# Patient Record
Sex: FEMALE
[De-identification: names, ages, dates, MRNs, and addresses within clinical notes are randomized per-mention and may not be internally consistent; named-entity substitution may affect disease eponyms.]

---

## 2017-12-17 ENCOUNTER — HOSPITAL ENCOUNTER (EMERGENCY)
Dept: HOSPITAL 5 - ED | Age: 67
Discharge: LEFT BEFORE DISCHARGE | End: 2017-12-17
Payer: SELF-PAY

## 2017-12-17 VITALS — SYSTOLIC BLOOD PRESSURE: 159 MMHG | DIASTOLIC BLOOD PRESSURE: 104 MMHG

## 2017-12-17 DIAGNOSIS — J44.1: Primary | ICD-10-CM

## 2017-12-17 DIAGNOSIS — I50.9: ICD-10-CM

## 2017-12-17 DIAGNOSIS — Z88.6: ICD-10-CM

## 2017-12-17 DIAGNOSIS — Z99.81: ICD-10-CM

## 2017-12-17 DIAGNOSIS — Z86.73: ICD-10-CM

## 2017-12-17 DIAGNOSIS — Z98.51: ICD-10-CM

## 2017-12-17 DIAGNOSIS — I11.0: ICD-10-CM

## 2017-12-17 DIAGNOSIS — Z88.8: ICD-10-CM

## 2017-12-17 DIAGNOSIS — M19.90: ICD-10-CM

## 2017-12-17 DIAGNOSIS — I25.2: ICD-10-CM

## 2017-12-17 DIAGNOSIS — E11.9: ICD-10-CM

## 2017-12-17 DIAGNOSIS — F17.210: ICD-10-CM

## 2017-12-17 LAB
ANION GAP SERPL CALC-SCNC: 13 MMOL/L
BASOPHILS NFR BLD AUTO: 0.5 % (ref 0–1.8)
BUN SERPL-MCNC: 15 MG/DL (ref 7–17)
BUN/CREAT SERPL: 25 %
CALCIUM SERPL-MCNC: 8.6 MG/DL (ref 8.4–10.2)
CHLORIDE SERPL-SCNC: 92.9 MMOL/L (ref 98–107)
CO2 SERPL-SCNC: 32 MMOL/L (ref 22–30)
EOSINOPHIL NFR BLD AUTO: 0.5 % (ref 0–4.3)
GLUCOSE SERPL-MCNC: 122 MG/DL (ref 65–100)
HCT VFR BLD CALC: 45.1 % (ref 30.3–42.9)
HGB BLD-MCNC: 14.9 GM/DL (ref 10.1–14.3)
MCH RBC QN AUTO: 29 PG (ref 28–32)
MCHC RBC AUTO-ENTMCNC: 33 % (ref 30–34)
MCV RBC AUTO: 88 FL (ref 79–97)
PLATELET # BLD: 248 K/MM3 (ref 140–440)
POTASSIUM SERPL-SCNC: 3.9 MMOL/L (ref 3.6–5)
RBC # BLD AUTO: 5.12 M/MM3 (ref 3.65–5.03)
SODIUM SERPL-SCNC: 134 MMOL/L (ref 137–145)
WBC # BLD AUTO: 8.4 K/MM3 (ref 4.5–11)

## 2017-12-17 PROCEDURE — 93010 ELECTROCARDIOGRAM REPORT: CPT

## 2017-12-17 PROCEDURE — 84484 ASSAY OF TROPONIN QUANT: CPT

## 2017-12-17 PROCEDURE — 71020: CPT

## 2017-12-17 PROCEDURE — 93005 ELECTROCARDIOGRAM TRACING: CPT

## 2017-12-17 PROCEDURE — 99284 EMERGENCY DEPT VISIT MOD MDM: CPT

## 2017-12-17 PROCEDURE — 83880 ASSAY OF NATRIURETIC PEPTIDE: CPT

## 2017-12-17 PROCEDURE — 36415 COLL VENOUS BLD VENIPUNCTURE: CPT

## 2017-12-17 PROCEDURE — 85025 COMPLETE CBC W/AUTO DIFF WBC: CPT

## 2017-12-17 PROCEDURE — 87400 INFLUENZA A/B EACH AG IA: CPT

## 2017-12-17 PROCEDURE — 80048 BASIC METABOLIC PNL TOTAL CA: CPT

## 2017-12-17 NOTE — EMERGENCY DEPARTMENT REPORT
HPI





- General


Chief Complaint: Dyspnea/Respdistress





- HPI


HPI: 


This is a 67-year-old  female presents to the emergency department 

from home with complaint of some nausea and vomiting.  Patient says that she 

has been dealing with some "cold" symptoms and thinks that she might have "the 

flu."  The patient has a past medical history of asthma, CHF, COPD on 2 L nasal 

cannula, CVA, diabetes, coronary artery disease with MI and hypertension.  She 

is also a tobacco smoker but says that she is trying to quit.  She has a 

primary care physician, pulmonologist and cardiologist but has not seen them 

regarding these current symptoms.  She denies any fever, chest pain or 

diaphoresis.  She does have some shortness of breath but says that she always 

has this.  She also has bilateral lower extremity swelling that worsened a few 

days ago but once again she says that she always has those symptoms.








ED Past Medical Hx





- Past Medical History


Previous Medical History?: Yes


Hx Hypertension: Yes


Hx CVA: Yes


Hx Heart Attack/AMI: Yes


Hx Congestive Heart Failure: Yes


Hx Diabetes: Yes


Hx Arthritis: Yes


Hx Asthma: Yes


Hx COPD: Yes (Home 02 2 liters)


Additional medical history: smoke 1/2 pack cigarettes a day





- Surgical History


Additional Surgical History: tubal ligation





- Social History


Smoking Status: Current Every Day Smoker





- Medications


Home Medications: 


 Home Medications











 Medication  Instructions  Recorded  Confirmed  Last Taken  Type


 


Unobtainable [Unobtainable]  10/20/13 10/20/13 Unknown History














ED Review of Systems


ROS: 


Stated complaint: N/V; BODYACHES; FATIGUE


Other details as noted in HPI





Comment: All other systems reviewed and negative


Constitutional: denies: chills, fever


ENT: denies: ear pain, throat pain


Respiratory: cough, shortness of breath (chronic)


Cardiovascular: edema.  denies: chest pain


Gastrointestinal: nausea, vomiting.  denies: abdominal pain


Genitourinary: denies: urgency, dysuria, discharge


Musculoskeletal: denies: back pain, joint swelling, arthralgia


Skin: denies: rash, lesions





Physical Exam





- Physical Exam


Vital Signs: 


 Vital Signs











  12/17/17





  00:51


 


Temperature 98.6 F


 


Pulse Rate 86


 


Respiratory 24





Rate 


 


Blood Pressure 159/104


 


O2 Sat by Pulse 91





Oximetry 











Physical Exam: 


GENERAL: The patient is well-developed well-nourished.


HENT: Normocephalic.  Atraumatic.    Patient has moist mucous membranes.


EYES: Extraocular motions are intact.  Pupils equal reactive to light 

bilaterally.


NECK: Supple.  Trachea is midline.


CHEST/LUNGS: Coarse breath sound heard throughout the chest.  There is some 

mild wheezing heard.  Mild tachypnea but no accessory muscle use.  There is no 

respiratory distress noted.


HEART/CARDIOVASCULAR: Regular.  There is no tachycardia.  There is no murmur.


ABDOMEN: Abdomen is soft, nontender.  Patient has normal bowel sounds.  There 

is no abdominal distention.


SKIN: Skin is warm and dry.  There is some pitting edema to the bilateral lower 

extremities.


NEURO: The patient is awake, alert, and oriented.  The patient is cooperative.  

The patient has no focal neurologic deficits.  The patient has normal speech.


MUSCULOSKELETAL: There is no tenderness or deformity. There is no evidence of 

acute injury.








ED Course


 Vital Signs











  12/17/17





  00:51


 


Temperature 98.6 F


 


Pulse Rate 86


 


Respiratory 24





Rate 


 


Blood Pressure 159/104


 


O2 Sat by Pulse 91





Oximetry 














ED Medical Decision Making





- Lab Data


Result diagrams: 


 12/17/17 01:17





 12/17/17 01:17





- EKG Data


-: EKG Interpreted by Me


EKG shows normal: sinus rhythm, axis, intervals, QRS complexes, ST-T waves (

Nonspecific T-waves)


Rate: normal





- EKG Data


When compared to previous EKG there are: previous EKG unavailable


Interpretation: other (sinus rhythm, normal axis, normal rate, nonspecific T 

waves.)





- Radiology Data


Radiology results: image reviewed


interpreted by me: 





Chest x-ray shows some mild cardiomegaly and some pulmonary vascular 

congestion.  No obvious pleural effusions.  No obvious pneumonia.





- Medical Decision Making


The patient had the majority of her blood work done through triage.  It shows 

an equivocal troponin of 0.017.  She has a BNP of greater than 21,000 and both 

of this is without any renal insufficiency.  Chest x-ray shows some pulmonary 

vascular congestion.  Negative for influenza.  Patient has some hypoxia on room 

air but is usually 2 L oxygen dependent at home.  Even before I was able to get 

this patient's chart and see her in the room, she was already telling the nurse 

that she has to go home.  She is very concerned to make sure that her son is 

able to get home and get to work on time.  I sat down with the patient and 

explained that she had lab abnormalities and appeared to me to have signs of 

CHF and COPD exacerbation.  I feel that she requires admission for diuresis, 

breathing treatments and possibly cardio and/or pulmonology consultation.  

However the patient still refuses to stay for any further testing and certainly 

for admission.  She understands that this could lead to worsening of her nausea 

and vomiting, shortness of breath, heart attack, respiratory distress, coma or 

even death.  She says that she plans to follow-up with her primary care 

physician and her specialists as early this week as possible and says that she 

will return to the ER if anything changes.  Despite the risks mention to her, 

she does not want to stay and has decided to sign out AGAINST MEDICAL ADVICE.








- Differential Diagnosis


CHF, COPD, MI, PE


Critical Care Time: No


Critical care attestation.: 


If time is entered above; I have spent that time in minutes in the direct care 

of this critically ill patient, excluding procedure time.








ED Disposition


Clinical Impression: 


 COPD exacerbation, Lower extremity edema





CHF exacerbation


Qualifiers:


 Congestive heart failure type: unspecified congestive heart failure type 

Qualified Code(s): I50.9 - Heart failure, unspecified





Hypertension


Qualifiers:


 Hypertension type: essential hypertension Qualified Code(s): I10 - Essential (

primary) hypertension





Disposition: DC-07 LEFT AGAINST MED ADVICE


Is pt being admited?: No


Condition: Fair


Instructions:  Chronic Bronchitis (ED), Hypertension (ED)


Time of Disposition: 04:02

## 2017-12-17 NOTE — XRAY REPORT
FINAL REPORT



PROCEDURE:  XR CHEST ROUTINE 2V



TECHNIQUE:  PA and lateral chest radiographs were obtained. CPT

00884







HISTORY:  Shortness of breath, SOB, cough, and fever..started

getting worse today 



COMPARISON:  No prior studies are available for comparison.



FINDINGS:  

Heart: Heart is enlarged.



Mediastinum/Vessels: Normal.



Lungs/Pleural space: Lungs are expanded and clear. There are no

infiltrates, effusions or pneumothoraces..



Bony thorax: No acute osseous abnormality. There is degenerative

arthrosis of the glenohumeral joints. 



Other: 



IMPRESSION:  

Heart is enlarged. There is no acute lung disease.

## 2018-05-16 ENCOUNTER — HOSPITAL ENCOUNTER (INPATIENT)
Dept: HOSPITAL 5 - ED | Age: 68
LOS: 4 days | Discharge: HOME | DRG: 291 | End: 2018-05-20
Attending: INTERNAL MEDICINE | Admitting: INTERNAL MEDICINE
Payer: MEDICARE

## 2018-05-16 DIAGNOSIS — J44.1: ICD-10-CM

## 2018-05-16 DIAGNOSIS — F17.210: ICD-10-CM

## 2018-05-16 DIAGNOSIS — I25.2: ICD-10-CM

## 2018-05-16 DIAGNOSIS — Z88.5: ICD-10-CM

## 2018-05-16 DIAGNOSIS — Z72.89: ICD-10-CM

## 2018-05-16 DIAGNOSIS — I11.0: Primary | ICD-10-CM

## 2018-05-16 DIAGNOSIS — Z99.81: ICD-10-CM

## 2018-05-16 DIAGNOSIS — Z98.51: ICD-10-CM

## 2018-05-16 DIAGNOSIS — I50.23: ICD-10-CM

## 2018-05-16 DIAGNOSIS — M19.90: ICD-10-CM

## 2018-05-16 DIAGNOSIS — E87.1: ICD-10-CM

## 2018-05-16 DIAGNOSIS — E11.9: ICD-10-CM

## 2018-05-16 DIAGNOSIS — Z86.73: ICD-10-CM

## 2018-05-16 DIAGNOSIS — I42.0: ICD-10-CM

## 2018-05-16 DIAGNOSIS — J96.21: ICD-10-CM

## 2018-05-16 LAB
BASOPHILS # (AUTO): 0 K/MM3 (ref 0–0.1)
BASOPHILS NFR BLD AUTO: 0.2 % (ref 0–1.8)
BUN SERPL-MCNC: 12 MG/DL (ref 7–17)
BUN/CREAT SERPL: 24 %
CALCIUM SERPL-MCNC: 9.2 MG/DL (ref 8.4–10.2)
CREATININE,URINE: 19 MG/DL (ref 0.1–20)
EOSINOPHIL # BLD AUTO: 0 K/MM3 (ref 0–0.4)
EOSINOPHIL NFR BLD AUTO: 0.3 % (ref 0–4.3)
HCT VFR BLD CALC: 46.1 % (ref 30.3–42.9)
HEMOLYSIS INDEX: 3
HGB BLD-MCNC: 15.9 GM/DL (ref 10.1–14.3)
LYMPHOCYTES # BLD AUTO: 1.1 K/MM3 (ref 1.2–5.4)
LYMPHOCYTES NFR BLD AUTO: 12.7 % (ref 13.4–35)
MCH RBC QN AUTO: 30 PG (ref 28–32)
MCHC RBC AUTO-ENTMCNC: 35 % (ref 30–34)
MCV RBC AUTO: 88 FL (ref 79–97)
MONOCYTES # (AUTO): 0.8 K/MM3 (ref 0–0.8)
MONOCYTES % (AUTO): 8.7 % (ref 0–7.3)
PLATELET # BLD: 274 K/MM3 (ref 140–440)
RBC # BLD AUTO: 5.23 M/MM3 (ref 3.65–5.03)
T4 FREE SERPL-MCNC: 1.69 NG/DL (ref 0.76–1.46)

## 2018-05-16 PROCEDURE — 93005 ELECTROCARDIOGRAM TRACING: CPT

## 2018-05-16 PROCEDURE — 99406 BEHAV CHNG SMOKING 3-10 MIN: CPT

## 2018-05-16 PROCEDURE — 85025 COMPLETE CBC W/AUTO DIFF WBC: CPT

## 2018-05-16 PROCEDURE — 93010 ELECTROCARDIOGRAM REPORT: CPT

## 2018-05-16 PROCEDURE — 80061 LIPID PANEL: CPT

## 2018-05-16 PROCEDURE — 84484 ASSAY OF TROPONIN QUANT: CPT

## 2018-05-16 PROCEDURE — 80048 BASIC METABOLIC PNL TOTAL CA: CPT

## 2018-05-16 PROCEDURE — 84300 ASSAY OF URINE SODIUM: CPT

## 2018-05-16 PROCEDURE — 84439 ASSAY OF FREE THYROXINE: CPT

## 2018-05-16 PROCEDURE — 82570 ASSAY OF URINE CREATININE: CPT

## 2018-05-16 PROCEDURE — 93306 TTE W/DOPPLER COMPLETE: CPT

## 2018-05-16 PROCEDURE — 96374 THER/PROPH/DIAG INJ IV PUSH: CPT

## 2018-05-16 PROCEDURE — 84443 ASSAY THYROID STIM HORMONE: CPT

## 2018-05-16 PROCEDURE — 83880 ASSAY OF NATRIURETIC PEPTIDE: CPT

## 2018-05-16 PROCEDURE — 82962 GLUCOSE BLOOD TEST: CPT

## 2018-05-16 PROCEDURE — 94760 N-INVAS EAR/PLS OXIMETRY 1: CPT

## 2018-05-16 PROCEDURE — 71045 X-RAY EXAM CHEST 1 VIEW: CPT

## 2018-05-16 PROCEDURE — 36415 COLL VENOUS BLD VENIPUNCTURE: CPT

## 2018-05-16 PROCEDURE — 94640 AIRWAY INHALATION TREATMENT: CPT

## 2018-05-16 PROCEDURE — 83036 HEMOGLOBIN GLYCOSYLATED A1C: CPT

## 2018-05-16 RX ADMIN — FUROSEMIDE SCH MG: 20 TABLET ORAL at 18:36

## 2018-05-16 RX ADMIN — Medication SCH ML: at 22:03

## 2018-05-16 NOTE — CONSULTATION
History of Present Illness


Consult date: 05/16/18


Consult reason: congestive heart failure


History of present illness: 





Patient is a 67yr old woman with a history of COPD on home oxygen who continues 

to smoke cigarettes. She was brought to the emergency department with shortness 

of breath, active wheezing, admitted with COPD exacerbation. 





Chest xray reports marked cardiomegaly with mild interstitial edema. Labs 

notable for a BNP of 6600 and severe hyponatremia, sodium of 122. She denies 

chest pain. Noted with lower extremity edema.  Findings consistent with heart 

failure. Patient denies a prior cardiac history. She denies prior cardiac 

workup. Her EKG is a sinus rhythm with LVH. No acute ischemic changes. Cardiac 

consultation was requested for further CHF evaluation and management.











Past History


Social history: , smoking


Family history: no significant family history (reviewed)





Medications and Allergies


 Allergies











Allergy/AdvReac Type Severity Reaction Status Date / Time


 


ibuprofen [From Motrin] Allergy  Rash Verified 05/16/18 13:23


 


codeine AdvReac  Vomiting Verified 05/16/18 13:23


 


etodolac [From Lodine] AdvReac  Vomiting Verified 05/16/18 13:23











 Home Medications











 Medication  Instructions  Recorded  Confirmed  Last Taken  Type


 


Unobtainable  10/20/13 10/20/13 Unknown History











Active Meds: 


Active Medications





Acetaminophen (Tylenol)  650 mg PO Q4H PRN


   PRN Reason: Pain MILD(1-3)/Fever >100.5/HA


Albuterol (Proventil)  2.5 mg IH Q4HRT PRN


   PRN Reason: Shortness Of Breath


Furosemide (Lasix)  20 mg PO BID@0600,1800 KEYLA


Ondansetron HCl (Zofran)  4 mg IV Q8H PRN


   PRN Reason: Nausea And Vomiting


Sodium Chloride (Sodium Chloride Flush Syringe 10 Ml)  10 ml IV BID KEYLA


Sodium Chloride (Sodium Chloride Flush Syringe 10 Ml)  10 ml IV PRN PRN


   PRN Reason: LINE FLUSH











Physical Examination


 Vital Signs











Resp


 


 28 H


 


 05/16/18 13:14











General appearance: mild distress


Cardiac: Positive: Reg Rate and Rhythm


Lungs: Positive: Wheezes


Extremities: Present: +3 Edema





Results





 05/16/18 13:31





 05/16/18 13:31


 CBC











  05/16/18 Range/Units





  13:31 


 


WBC  8.8  (4.5-11.0)  K/mm3


 


RBC  5.23 H  (3.65-5.03)  M/mm3


 


Hgb  15.9 H  (10.1-14.3)  gm/dl


 


Hct  46.1 H  (30.3-42.9)  %


 


Plt Count  274  (140-440)  K/mm3


 


Lymph #  1.1 L  (1.2-5.4)  K/mm3


 


Mono #  0.8  (0.0-0.8)  K/mm3


 


Eos #  0.0  (0.0-0.4)  K/mm3


 


Baso #  0.0  (0.0-0.1)  K/mm3








 Comprehensive Metabolic Panel











  05/16/18 Range/Units





  13:31 


 


Sodium  122 L  (137-145)  mmol/L


 


Potassium  3.7  (3.6-5.0)  mmol/L


 


Chloride  77.0 L  ()  mmol/L


 


Carbon Dioxide  36 H  (22-30)  mmol/L


 


BUN  12  (7-17)  mg/dL


 


Creatinine  0.5 L  (0.7-1.2)  mg/dL


 


Glucose  135 H  ()  mg/dL


 


Calcium  9.2  (8.4-10.2)  mg/dL














Assessment and Plan





COPD exacerbation


Acute heart failure


Hyponatremia





We will get an echocardiogram for LVEF assessment.


Pulmonary consultation for management of COPD.

## 2018-05-16 NOTE — XRAY REPORT
Single view chest: Compared to 12/17/17.



History: Shortness of breath.



Findings:



Marked cardiomegaly. Trachea is midline. Mild to moderate venous 

congestion. No consolidation or pleural effusion.



Impression:



Probable early CHF. The No significant interval change.

## 2018-05-16 NOTE — HISTORY AND PHYSICAL REPORT
History of Present Illness


Chief complaint: 





Im short of breath


History of present illness: 


68 YO Female with HTN, CVA, DM, OA, COPD on Home Oxygen, Chronic Respiratory 

Failure, Nicotine Dependence presents to ED for evaluation. Pt states that she 

has experienced shortness of breath and cough for the past 3 days with 

worsening symptoms over the past 2 days. Pt acknowledges Orthopnea/PND, 

Bilateral leg swelling. Pt states she became concerned this morning, and called 

EMS. Upon arrival, the patient was found to have Acute hypoxemic Respiratory 

Failure with pulse oximetry of 82% on room air. PT was treated with 

supplemental oxygen and transported to [facility] for further care and evaluation. Pt 

seen and evaluated in ED and found to have Acute CHF with concomitant 

respiratory failure. Pt admitted to telemetry. Cardiology consulted in ED. Pt 

denies fever, chills, CP, Palpitations, NVD, Syncope, Trauma, BRBPR, hemoptysis

, or recent ill contacts. 





Past History


Past Medical History: acute MI, arthritis, COPD, diabetes, heart failure, 

hypertension, stroke


Past Surgical History: Other (Tubal ligation)


Social history: , smoking


Family history: no significant family history (reviewed)





Medications and Allergies


 Allergies











Allergy/AdvReac Type Severity Reaction Status Date / Time


 


ibuprofen [From Motrin] Allergy  Rash Verified 05/16/18 13:23


 


codeine AdvReac  Vomiting Verified 05/16/18 13:23


 


etodolac [From Lodine] AdvReac  Vomiting Verified 05/16/18 13:23











 Home Medications











 Medication  Instructions  Recorded  Confirmed  Last Taken  Type


 


Unobtainable  10/20/13 10/20/13 Unknown History














Review of Systems


Constitutional: no weight loss, no weight gain, no fever, no chills


Ears, nose, mouth and throat: no ear pain, no ear discharge, no tinnitis, no 

decreased hearing, no nose pain, no nasal congestion


Breasts: no change in shape, no swelling, no mass


Cardiovascular: orthopnea, edema, shortness of breath, paroxysmal nocturnal 

dyspnea, no chest pain, no syncope


Respiratory: cough, no excessive sputum, no hemoptysis


Gastrointestinal: no nausea, no vomiting, no diarrhea, no constipation


Genitourinary Female: no pelvic pain, no flank pain, no dysuria, no urinary 

frequency, no urgency


Rectal: no pain, no incontinence, no bleeding


Musculoskeletal: no neck pain, no shooting arm pain, no arm numbness/tingling, 

no low back pain, no shooting leg pain, no leg numbness/tingling


Integumentary: no rash, no pruritis, no redness, no sores, no wounds


Neurological: no weakness, no parathesias, no numbness, no tingling, no seizures

, no syncope


Endocrine: no cold intolerance, no heat intolerance, no polyphagia, no 

excessive thirst, no polydipsia, no polyuria


Hematologic/Lymphatic: no easy bruising, no easy bleeding, no lymphadenopathy, 

no lymphedema


Allergic/Immunologic: no urticaria, no allergic rhinitis, no wheezing, no 

persistent infections, no anaphylaxis





Exam





- Constitutional


Vitals: 


 











Temp Pulse Resp BP Pulse Ox


 


 98.1 F   73   18   130/70   92 


 


 05/16/18 13:24  05/16/18 14:30  05/16/18 14:30  05/16/18 14:30  05/16/18 14:30











General appearance: Present: mild distress





- EENT


Eyes: Present: PERRL


ENT: hearing intact, clear oral mucosa





- Neck


Neck: Present: supple, normal ROM





- Respiratory


Respiratory effort: labored


Respiratory: bilateral: diminished, rhonchi





- Cardiovascular


Heart Sounds: Present: S1 & S2.  Absent: rub, click





- Extremities


Extremities: pulses symmetrical, No edema


Peripheral Pulses: within normal limits





- Abdominal


General gastrointestinal: Present: soft, non-tender, non-distended, normal 

bowel sounds


Female genitourinary: Present: normal





- Integumentary


Integumentary: Present: clear, warm, dry





- Musculoskeletal


Musculoskeletal: gait normal, strength equal bilaterally





- Psychiatric


Psychiatric: appropriate mood/affect, intact judgment & insight





- Neurologic


Neurologic: CNII-XII intact, moves all extremities





Results





- Labs


CBC & Chem 7: 


 05/16/18 13:31





 05/16/18 13:31


Labs: 


 Abnormal lab results











  05/16/18 05/16/18 05/16/18 Range/Units





  13:31 13:31 14:22 


 


RBC   5.23 H   (3.65-5.03)  M/mm3


 


Hgb   15.9 H   (10.1-14.3)  gm/dl


 


Hct   46.1 H   (30.3-42.9)  %


 


MCHC   35 H   (30-34)  %


 


Lymph % (Auto)   12.7 L   (13.4-35.0)  %


 


Mono % (Auto)   8.7 H   (0.0-7.3)  %


 


Lymph #   1.1 L   (1.2-5.4)  K/mm3


 


Seg Neutrophils %   78.1 H   (40.0-70.0)  %


 


Sodium  122 L    (137-145)  mmol/L


 


Chloride  77.0 L    ()  mmol/L


 


Carbon Dioxide  36 H    (22-30)  mmol/L


 


Creatinine  0.5 L    (0.7-1.2)  mg/dL


 


Glucose  135 H    ()  mg/dL


 


POC Glucose    137 H  ()  


 


NT-Pro-B Natriuret Pep  6600 H    (0-900)  pg/mL














Assessment and Plan





- Patient Problems


(1) CHF (congestive heart failure)


Current Visit: Yes   Status: Acute   


Qualifiers: 


   Heart failure type: systolic   Heart failure chronicity: acute   Qualified 

Code(s): I50.21 - Acute systolic (congestive) heart failure   


Plan to address problem: 


Admit to telemetry, Strict I/O, Daily weight, monitor uop q shift, supplemental 

oxygen, Echo, cardiology consulted in ED, thyroid panel, BNP,Chest X ray








(2) Hyponatremia syndrome


Current Visit: Yes   Status: Acute   


Plan to address problem: 


Urine electrolytes, repeat bmp, 








(3) COPD (chronic obstructive pulmonary disease)


Current Visit: Yes   Status: Acute   


Qualifiers: 


   COPD type: chronic bronchitis 


Plan to address problem: 


supplemental oxygen, nebulizer therapy, incentive spirometry, smoking cessation 

counseling, NIPPV as clinically indicated. 








(4) Acute respiratory failure


Current Visit: Yes   Status: Acute   


Qualifiers: 


   Respiratory failure complication: hypoxia   Qualified Code(s): J96.01 - 

Acute respiratory failure with hypoxia   


Plan to address problem: 


Supplemental oxygen, nebulizer therapy, diuresis, pulse oximetry, 








(5) DVT prophylaxis


Current Visit: Yes   Status: Acute   


Plan to address problem: 


SCD to BLE while in bed

## 2018-05-16 NOTE — EMERGENCY DEPARTMENT REPORT
ED Shortness of Breath HPI





- General


Chief Complaint: Dyspnea/Respdistress


Stated Complaint: DIFFICULTY  BREATHING


Time Seen by Provider: 05/16/18 13:33


Source: patient, EMS


Mode of arrival: Stretcher


Limitations: No Limitations





- History of Present Illness


Initial Comments: 





Patient is a 67-year-old  female with past medical history of COPD and 

CHF who is presenting with shortness of breath.  Patient states she's had some 

increased work of breathing for the past 2-3 days.  Patient called 911 and when 

she was seen by the paramedics her O2 sats when the low 80s.  Patient was noted 

to be wheezing in route and did receive a breathing treatment and Solu-Medrol.  

Patient states that she has had a cough is nonproductive she denies any fevers 

nausea vomiting diarrhea abdominal pain at this time.





- Related Data


 Home Medications











 Medication  Instructions  Recorded  Confirmed  Last Taken


 


Unobtainable  10/20/13 10/20/13 Unknown











 Allergies











Allergy/AdvReac Type Severity Reaction Status Date / Time


 


ibuprofen [From Motrin] Allergy  Rash Verified 05/16/18 13:23


 


codeine AdvReac  Vomiting Verified 05/16/18 13:23


 


etodolac [From Lodine] AdvReac  Vomiting Verified 05/16/18 13:23














ED Review of Systems


ROS: 


Stated complaint: DIFFICULTY  BREATHING


Other details as noted in HPI





Comment: All other systems reviewed and negative





ED Past Medical Hx





- Past Medical History


Hx Hypertension: Yes


Hx CVA: Yes


Hx Heart Attack/AMI: Yes


Hx Congestive Heart Failure: Yes


Hx Diabetes: Yes


Hx Arthritis: Yes


Hx Asthma: Yes


Hx COPD: Yes (Home 02 2 liters)


Additional medical history: smoke 1/2 pack cigarettes a day





- Surgical History


Additional Surgical History: tubal ligation





- Social History


Smoking Status: Current Every Day Smoker


Substance Use Type: Alcohol





- Medications


Home Medications: 


 Home Medications











 Medication  Instructions  Recorded  Confirmed  Last Taken  Type


 


Unobtainable  10/20/13 10/20/13 Unknown History














ED Physical Exam





- General


Limitations: No Limitations


General appearance: alert, in no apparent distress





- Head


Head exam: Present: atraumatic, normocephalic





- Eye


Eye exam: Present: normal appearance





- ENT


ENT exam: Present: mucous membranes moist





- Neck


Neck exam: Present: normal inspection





- Respiratory


Respiratory exam: Present: normal lung sounds bilaterally, rales.  Absent: 

respiratory distress, wheezes, rhonchi, stridor





- Cardiovascular


Cardiovascular Exam: Present: regular rate, normal rhythm.  Absent: systolic 

murmur, diastolic murmur, rubs, gallop





- GI/Abdominal


GI/Abdominal exam: Present: soft, normal bowel sounds.  Absent: distended, 

tenderness, guarding





- Extremities Exam


Extremities exam: Present: normal inspection





- Back Exam


Back exam: Present: normal inspection





- Neurological Exam


Neurological exam: Present: alert, oriented X3





- Psychiatric


Psychiatric exam: Present: normal affect, normal mood





- Skin


Skin exam: Present: warm, dry, intact, normal color.  Absent: rash





ED Course


 Vital Signs











  05/16/18 05/16/18 05/16/18





  13:14 13:15 13:24


 


Temperature   98.1 F


 


Pulse Rate  82 77


 


Respiratory 28 H 27 H 22





Rate   


 


Blood Pressure   138/70


 


O2 Sat by Pulse  83 L 96





Oximetry   














  05/16/18 05/16/18 05/16/18





  13:30 13:45 14:01


 


Temperature   


 


Pulse Rate 75 77 85


 


Respiratory 18 23 29 H





Rate   


 


Blood Pressure 138/64 129/69 128/60


 


O2 Sat by Pulse 100 76 L 99





Oximetry   














  05/16/18





  14:30


 


Temperature 


 


Pulse Rate 73


 


Respiratory 18





Rate 


 


Blood Pressure 130/70


 


O2 Sat by Pulse 92





Oximetry 














ED Medical Decision Making





- Lab Data


Result diagrams: 


 05/16/18 13:31





 05/16/18 13:31








 Lab Results











  05/16/18 05/16/18 05/16/18 Range/Units





  13:31 13:31 14:22 


 


WBC   8.8   (4.5-11.0)  K/mm3


 


RBC   5.23 H   (3.65-5.03)  M/mm3


 


Hgb   15.9 H   (10.1-14.3)  gm/dl


 


Hct   46.1 H   (30.3-42.9)  %


 


MCV   88   (79-97)  fl


 


MCH   30   (28-32)  pg


 


MCHC   35 H   (30-34)  %


 


RDW   13.9   (13.2-15.2)  %


 


Plt Count   274   (140-440)  K/mm3


 


Lymph % (Auto)   12.7 L   (13.4-35.0)  %


 


Mono % (Auto)   8.7 H   (0.0-7.3)  %


 


Eos % (Auto)   0.3   (0.0-4.3)  %


 


Baso % (Auto)   0.2   (0.0-1.8)  %


 


Lymph #   1.1 L   (1.2-5.4)  K/mm3


 


Mono #   0.8   (0.0-0.8)  K/mm3


 


Eos #   0.0   (0.0-0.4)  K/mm3


 


Baso #   0.0   (0.0-0.1)  K/mm3


 


Seg Neutrophils %   78.1 H   (40.0-70.0)  %


 


Seg Neutrophils #   6.9   (1.8-7.7)  K/mm3


 


Sodium  122 L    (137-145)  mmol/L


 


Potassium  3.7    (3.6-5.0)  mmol/L


 


Chloride  77.0 L    ()  mmol/L


 


Carbon Dioxide  36 H    (22-30)  mmol/L


 


Anion Gap  13    mmol/L


 


BUN  12    (7-17)  mg/dL


 


Creatinine  0.5 L    (0.7-1.2)  mg/dL


 


Estimated GFR  > 60    ml/min


 


BUN/Creatinine Ratio  24    %


 


Glucose  135 H    ()  mg/dL


 


POC Glucose    137 H  ()  


 


Calcium  9.2    (8.4-10.2)  mg/dL


 


Troponin T  < 0.010    (0.00-0.029)  ng/mL


 


NT-Pro-B Natriuret Pep  6600 H    (0-900)  pg/mL














- EKG Data


-: EKG Interpreted by Me





- EKG Data


Interpretation: other (EKG shows sinus rhythm rate of 75 normal axis normal 

intervals lateral T-wave inversions and no ST segment elevations or depressions

, interpretation is 1335)





- Radiology Data





Chest x-ray shows pulmonary edema and cardiomegaly consistent with CHF 

exacerbation





- Medical Decision Making





Patient is a 67-year-old  female who is here with respiratory 

distress.  Patient's wheezing had improved by time she arrived.  Patient was 

given 60 of Lasix IV.  Patient was tried on nasal cannula however her O2 sats 

continued to drop when she is not O nonrebreather.  Patient was started on 

Ventimask and respiratory will monitor.  Patient will be admitted at this time 

to Dr. Canela with the hospitalist service.  We will continue to monitor the 

patient's O2 sat restore status and given the respiratory therapist the go 

ahead to start the patient on BiPAP if warranted.


Critical care attestation.: 


If time is entered above; I have spent that time in minutes in the direct care 

of this critically ill patient, excluding procedure time.








ED Disposition


Clinical Impression: 


 Hypoxia





CHF exacerbation


Qualifiers:


 Heart failure type: unspecified Qualified Code(s): I50.9 - Heart failure, 

unspecified





Disposition: DC-01 TO HOME OR SELFCARE


Is pt being admited?: Yes


Does the pt Need Aspirin: No


Condition: Serious

## 2018-05-17 LAB — HDLC SERPL-MCNC: 58 MG/DL (ref 40–59)

## 2018-05-17 RX ADMIN — IPRATROPIUM BROMIDE AND ALBUTEROL SULFATE SCH AMPUL: .5; 3 SOLUTION RESPIRATORY (INHALATION) at 16:45

## 2018-05-17 RX ADMIN — MILRINONE LACTATE IN DEXTROSE SCH MLS/HR: 200 INJECTION, SOLUTION INTRAVENOUS at 18:44

## 2018-05-17 RX ADMIN — Medication SCH ML: at 11:42

## 2018-05-17 RX ADMIN — IPRATROPIUM BROMIDE AND ALBUTEROL SULFATE SCH: .5; 3 SOLUTION RESPIRATORY (INHALATION) at 16:49

## 2018-05-17 RX ADMIN — CARVEDILOL SCH: 3.12 TABLET, FILM COATED ORAL at 22:00

## 2018-05-17 RX ADMIN — FUROSEMIDE SCH MG: 20 TABLET ORAL at 06:16

## 2018-05-17 RX ADMIN — ACETAMINOPHEN PRN MG: 325 TABLET ORAL at 11:43

## 2018-05-17 RX ADMIN — IPRATROPIUM BROMIDE AND ALBUTEROL SULFATE SCH AMPUL: .5; 3 SOLUTION RESPIRATORY (INHALATION) at 21:15

## 2018-05-17 NOTE — PROGRESS NOTE
Assessment and Plan





COPD exacerbation


Acute systolic heart failure, uncertain etiology


   severe 4 chamber dilated cardiomyopathy, EF 10-15% by echocardiogram.


Hyponatremia





Recommend:


Medical therapy for acute systolic heart failure.


Sodium/fluid restriction.


Daily weight.


Further ischemic cardiac evaluation will depend on clinical course.





Subjective


Date of service: 05/17/18


Interval history: 





Patient is still short of breath. She denies chest pain.





Objective


 Vital Signs











  Temp Pulse Pulse Resp BP Pulse Ox


 


 05/17/18 12:02    79  18   95


 


 05/17/18 12:00   82    


 


 05/17/18 11:19  97.6 F  74   20  116/67  98


 


 05/17/18 07:39  97.9 F  66   18  120/68  100


 


 05/17/18 04:33  97.5 F L  81    125/79  97


 


 05/17/18 04:00   77    


 


 05/16/18 23:35  98.0 F  64    115/57  99


 


 05/16/18 20:47       97


 


 05/16/18 20:31       98


 


 05/16/18 20:18  97.6 F     


 


 05/16/18 20:00   74    


 


 05/16/18 19:20   75    134/69  99


 


 05/16/18 18:26  97.8 F  80    135/68  96


 


 05/16/18 16:51   72   16  115/51  94


 


 05/16/18 16:41   77   16  135/63  93


 


 05/16/18 16:30   76   18  135/63  92


 


 05/16/18 16:21   81   22  129/57  93


 


 05/16/18 16:11   66   15  129/57  92


 


 05/16/18 16:00   77   17  129/57  87














- Physical Examination


General: Other (Mild distress)


Cardiac: Positive: Reg Rate and Rhythm


Lungs: Positive: Decreased Breath Sounds


Neuro: Positive: Grossly Intact


Extremities: Present: +2 Edema

## 2018-05-17 NOTE — PROGRESS NOTE
Assessment and Plan


Assessment and plan: 





68 YO Female with HTN, CVA, DM, OA, COPD on Home Oxygen, Chronic Respiratory 

Failure, Nicotine Dependence presents to ED for evaluation. Pt states that she 

has experienced shortness of breath and cough for the past 3 days with 

worsening symptoms over the past 2 days. Pt acknowledges Orthopnea/PND, 

Bilateral leg swelling. Pt states she became concerned this morning, and called 

EMS. Upon arrival, the patient was found to have Acute hypoxemic Respiratory 

Failure with pulse oximetry of 82% on room air. PT was treated with 

supplemental oxygen and transported to [facility] for further care and evaluation. 





Acute on chronic respiratory failure


COPD exacerbation


New onset systolic CHF


Diabetes mellitus


Nicotine dependence


- This morning echo was done and showed ejection fraction of 10-15%, patient is 

on milrinone drip and other appropriate CHF medications, cardiology consult 

appreciated


- COPD exacerbation is dictated with IV Solu-Medrol, nebulizer, oxygen support


- Sliding-scale insulin, patient consulted about cessation of smoking


DVT prophylaxis


- Heparin


Disposition


- Continue inpatient care, patient doing systemic workup once she is stable 

from her COPD





History


Interval history: 





Patient was seen and evaluated this morning, patient says shortness of breath 

is getting better.





Hospitalist Physical





- Physical exam


Narrative exam: 





 Not in cardiopulmonary distress. 


 The patient appeared well nourished and normally developed.


 Vital signs as documented.


 Head exam is unremarkable.


 No scleral icterus .


 Neck is without jugular venous distension, thyromegaly, or carotid bruits. 


 Lungs scattered wheezing.


Cardiac exam reveals regular rate and  Rhythm.. 


Abdominal exam reveals normal bowel sounds, no masses, no organomegaly and no 

aortic enlargement. 


Extremities are nonedematous and both femoral and pedal pulses are normal.


CNS: Alert and oriented 3.  No focal weakness.





- Constitutional


Vitals: 


 











Temp Pulse Resp BP Pulse Ox


 


 97.6 F   72   21   116/67   95 


 


 05/17/18 11:19  05/17/18 16:47  05/17/18 16:47  05/17/18 11:19  05/17/18 12:02











General appearance: Present: mild distress





Results





- Labs


CBC & Chem 7: 


 05/16/18 13:31





 05/16/18 13:31


Labs: 


 Laboratory Last Values











WBC  8.8 K/mm3 (4.5-11.0)   05/16/18  13:31    


 


RBC  5.23 M/mm3 (3.65-5.03)  H  05/16/18  13:31    


 


Hgb  15.9 gm/dl (10.1-14.3)  H  05/16/18  13:31    


 


Hct  46.1 % (30.3-42.9)  H  05/16/18  13:31    


 


MCV  88 fl (79-97)   05/16/18  13:31    


 


MCH  30 pg (28-32)   05/16/18  13:31    


 


MCHC  35 % (30-34)  H  05/16/18  13:31    


 


RDW  13.9 % (13.2-15.2)   05/16/18  13:31    


 


Plt Count  274 K/mm3 (140-440)   05/16/18  13:31    


 


Lymph % (Auto)  12.7 % (13.4-35.0)  L  05/16/18  13:31    


 


Mono % (Auto)  8.7 % (0.0-7.3)  H  05/16/18  13:31    


 


Eos % (Auto)  0.3 % (0.0-4.3)   05/16/18  13:31    


 


Baso % (Auto)  0.2 % (0.0-1.8)   05/16/18  13:31    


 


Lymph #  1.1 K/mm3 (1.2-5.4)  L  05/16/18  13:31    


 


Mono #  0.8 K/mm3 (0.0-0.8)   05/16/18  13:31    


 


Eos #  0.0 K/mm3 (0.0-0.4)   05/16/18  13:31    


 


Baso #  0.0 K/mm3 (0.0-0.1)   05/16/18  13:31    


 


Seg Neutrophils %  78.1 % (40.0-70.0)  H  05/16/18  13:31    


 


Seg Neutrophils #  6.9 K/mm3 (1.8-7.7)   05/16/18  13:31    


 


Sodium  122 mmol/L (137-145)  L  05/16/18  13:31    


 


Potassium  3.7 mmol/L (3.6-5.0)   05/16/18  13:31    


 


Chloride  77.0 mmol/L ()  L  05/16/18  13:31    


 


Carbon Dioxide  36 mmol/L (22-30)  H  05/16/18  13:31    


 


Anion Gap  13 mmol/L  05/16/18  13:31    


 


BUN  12 mg/dL (7-17)   05/16/18  13:31    


 


Creatinine  0.5 mg/dL (0.7-1.2)  L  05/16/18  13:31    


 


Estimated GFR  > 60 ml/min  05/16/18  13:31    


 


BUN/Creatinine Ratio  24 %  05/16/18  13:31    


 


Glucose  135 mg/dL ()  H  05/16/18  13:31    


 


POC Glucose  136  ()  H  05/17/18  06:17    


 


Calcium  9.2 mg/dL (8.4-10.2)   05/16/18  13:31    


 


Troponin T  < 0.010 ng/mL (0.00-0.029)   05/16/18  13:31    


 


NT-Pro-B Natriuret Pep  6600 pg/mL (0-900)  H  05/16/18  13:31    


 


TSH  3.900 mlU/mL (0.270-4.200)   05/16/18  16:13    


 


Free T4  1.69 ng/dL (0.76-1.46)  H  05/16/18  16:13    


 


Urine Creatinine  19.0 mg/dL (0.1-20.0)   05/16/18  19:57    


 


Urine Sodium  92 mmol/L  05/16/18  19:57

## 2018-05-18 LAB
BUN SERPL-MCNC: 15 MG/DL (ref 7–17)
BUN/CREAT SERPL: 30 %
CALCIUM SERPL-MCNC: 8.5 MG/DL (ref 8.4–10.2)
HEMOLYSIS INDEX: 4

## 2018-05-18 RX ADMIN — IPRATROPIUM BROMIDE AND ALBUTEROL SULFATE SCH AMPUL: .5; 3 SOLUTION RESPIRATORY (INHALATION) at 18:10

## 2018-05-18 RX ADMIN — IPRATROPIUM BROMIDE AND ALBUTEROL SULFATE SCH AMPUL: .5; 3 SOLUTION RESPIRATORY (INHALATION) at 21:59

## 2018-05-18 RX ADMIN — HEPARIN SODIUM SCH UNIT: 5000 INJECTION, SOLUTION INTRAVENOUS; SUBCUTANEOUS at 00:30

## 2018-05-18 RX ADMIN — Medication SCH ML: at 10:38

## 2018-05-18 RX ADMIN — FUROSEMIDE SCH MG: 10 INJECTION, SOLUTION INTRAVENOUS at 10:42

## 2018-05-18 RX ADMIN — HEPARIN SODIUM SCH UNIT: 5000 INJECTION, SOLUTION INTRAVENOUS; SUBCUTANEOUS at 22:03

## 2018-05-18 RX ADMIN — CARVEDILOL SCH MG: 3.12 TABLET, FILM COATED ORAL at 10:38

## 2018-05-18 RX ADMIN — Medication SCH ML: at 00:30

## 2018-05-18 RX ADMIN — Medication SCH ML: at 23:04

## 2018-05-18 RX ADMIN — MILRINONE LACTATE IN DEXTROSE SCH MLS/HR: 200 INJECTION, SOLUTION INTRAVENOUS at 06:38

## 2018-05-18 RX ADMIN — HEPARIN SODIUM SCH UNIT: 5000 INJECTION, SOLUTION INTRAVENOUS; SUBCUTANEOUS at 10:37

## 2018-05-18 RX ADMIN — IPRATROPIUM BROMIDE AND ALBUTEROL SULFATE SCH AMPUL: .5; 3 SOLUTION RESPIRATORY (INHALATION) at 08:52

## 2018-05-18 RX ADMIN — ACETAMINOPHEN PRN MG: 325 TABLET ORAL at 22:05

## 2018-05-18 RX ADMIN — CARVEDILOL SCH MG: 3.12 TABLET, FILM COATED ORAL at 22:03

## 2018-05-18 RX ADMIN — POTASSIUM CHLORIDE SCH MEQ: 10 TABLET, EXTENDED RELEASE ORAL at 10:37

## 2018-05-18 RX ADMIN — LISINOPRIL SCH MG: 5 TABLET ORAL at 10:37

## 2018-05-18 NOTE — PROGRESS NOTE
Assessment and Plan


Assessment and plan: 





66 YO Female with HTN, CVA, DM, OA, COPD on Home Oxygen, Chronic Respiratory 

Failure, Nicotine Dependence presents to ED for evaluation. Pt states that she 

has experienced shortness of breath and cough for the past 3 days with 

worsening symptoms over the past 2 days. Pt acknowledges Orthopnea/PND, 

Bilateral leg swelling. Pt states she became concerned this morning, and called 

EMS. Upon arrival, the patient was found to have Acute hypoxemic Respiratory 

Failure with pulse oximetry of 82% on room air. PT was treated with 

supplemental oxygen and transported to [facility] for further care and evaluation. 





Acute on chronic respiratory failure


COPD exacerbation


New onset systolic CHF


Diabetes mellitus


Nicotine dependence


- This morning echo was done and showed ejection fraction of 10-15%, patient is 

on milrinone drip and other appropriate CHF medications, cardiology consult 

appreciated


- COPD exacerbation is dictated with IV Solu-Medrol, nebulizer, oxygen support


- Sliding-scale insulin, patient consulted about cessation of smoking


DVT prophylaxis


- Heparin


Disposition


- Continue inpatient care, patient doing systemic workup once she is stable 

from her COPD





severe hyponatremia


- tolvaptan was given








History


Interval history: 





Patient was seen and evaluated this morning, patient says shortness of breath 

is getting better. patient wants to go home.





Hospitalist Physical





- Physical exam


Narrative exam: 





 Not in cardiopulmonary distress. 


 The patient appeared well nourished and normally developed.


 Vital signs as documented.


 Head exam is unremarkable.


 No scleral icterus .


 Neck is without jugular venous distension, thyromegaly, or carotid bruits. 


 Lungs scattered wheezing.


Cardiac exam reveals regular rate and  Rhythm.. 


Abdominal exam reveals normal bowel sounds, no masses, no organomegaly and no 

aortic enlargement. 


Extremities are nonedematous and both femoral and pedal pulses are normal.


CNS: Alert and oriented 3.  No focal weakness.





- Constitutional


Vitals: 


 











Temp Pulse Resp BP Pulse Ox


 


 97.6 F   90   19   130/58   80 L


 


 05/18/18 07:53  05/18/18 10:37  05/18/18 09:02  05/18/18 07:53  05/18/18 08:56











General appearance: Present: mild distress





Results





- Labs


CBC & Chem 7: 


 05/16/18 13:31





 05/18/18 05:56


Labs: 


 Laboratory Last Values











WBC  8.8 K/mm3 (4.5-11.0)   05/16/18  13:31    


 


RBC  5.23 M/mm3 (3.65-5.03)  H  05/16/18  13:31    


 


Hgb  15.9 gm/dl (10.1-14.3)  H  05/16/18  13:31    


 


Hct  46.1 % (30.3-42.9)  H  05/16/18  13:31    


 


MCV  88 fl (79-97)   05/16/18  13:31    


 


MCH  30 pg (28-32)   05/16/18  13:31    


 


MCHC  35 % (30-34)  H  05/16/18  13:31    


 


RDW  13.9 % (13.2-15.2)   05/16/18  13:31    


 


Plt Count  274 K/mm3 (140-440)   05/16/18  13:31    


 


Lymph % (Auto)  12.7 % (13.4-35.0)  L  05/16/18  13:31    


 


Mono % (Auto)  8.7 % (0.0-7.3)  H  05/16/18  13:31    


 


Eos % (Auto)  0.3 % (0.0-4.3)   05/16/18  13:31    


 


Baso % (Auto)  0.2 % (0.0-1.8)   05/16/18  13:31    


 


Lymph #  1.1 K/mm3 (1.2-5.4)  L  05/16/18  13:31    


 


Mono #  0.8 K/mm3 (0.0-0.8)   05/16/18  13:31    


 


Eos #  0.0 K/mm3 (0.0-0.4)   05/16/18  13:31    


 


Baso #  0.0 K/mm3 (0.0-0.1)   05/16/18  13:31    


 


Seg Neutrophils %  78.1 % (40.0-70.0)  H  05/16/18  13:31    


 


Seg Neutrophils #  6.9 K/mm3 (1.8-7.7)   05/16/18  13:31    


 


Sodium  117 mmol/L (137-145)  L*  05/18/18  05:56    


 


Potassium  3.9 mmol/L (3.6-5.0)   05/18/18  05:56    


 


Chloride  73.9 mmol/L ()  L  05/18/18  05:56    


 


Carbon Dioxide  38 mmol/L (22-30)  H  05/18/18  05:56    


 


Anion Gap  9 mmol/L  05/18/18  05:56    


 


BUN  15 mg/dL (7-17)   05/18/18  05:56    


 


Creatinine  0.5 mg/dL (0.7-1.2)  L  05/18/18  05:56    


 


Estimated GFR  > 60 ml/min  05/18/18  05:56    


 


BUN/Creatinine Ratio  30 %  05/18/18  05:56    


 


Glucose  129 mg/dL ()  H  05/18/18  05:56    


 


POC Glucose  124  ()  H  05/18/18  12:05    


 


Hemoglobin A1c  6.3 % (4-6)  H  05/17/18  17:47    


 


Calcium  8.5 mg/dL (8.4-10.2)   05/18/18  05:56    


 


Troponin T  < 0.010 ng/mL (0.00-0.029)   05/16/18  13:31    


 


NT-Pro-B Natriuret Pep  6600 pg/mL (0-900)  H  05/16/18  13:31    


 


Triglycerides  67 mg/dL (2-149)   05/17/18  17:47    


 


Cholesterol  128 mg/dL ()   05/17/18  17:47    


 


LDL Cholesterol Direct  60 mg/dL ()   05/17/18  17:47    


 


HDL Cholesterol  58 mg/dL (40-59)   05/17/18  17:47    


 


Cholesterol/HDL Ratio  2.20 %  05/17/18  17:47    


 


TSH  3.900 mlU/mL (0.270-4.200)   05/16/18  16:13    


 


Free T4  1.69 ng/dL (0.76-1.46)  H  05/16/18  16:13    


 


Urine Creatinine  19.0 mg/dL (0.1-20.0)   05/16/18  19:57    


 


Urine Sodium  92 mmol/L  05/16/18  19:57

## 2018-05-18 NOTE — PROGRESS NOTE
Assessment and Plan





COPD exacerbation


Acute systolic heart failure, uncertain etiology


   severe dilated cardiomyopathy, EF 10-15% by echocardiogram.


Severe Hyponatremia





Recommend:


Medical therapy for acute systolic heart failure.


Sodium/fluid restriction.


Daily weight.


Noninvasive ischemic workup with a Persantine thallium will be recommended only 

after the patient's pulmonary status is optimized and has COPD extubation is 

completely resolved.








Subjective


Date of service: 05/18/18


Interval history: 





Patient remains short of breath. No distress noted.


Short burst on NSVT seen on telemetry overnight. It's reported the patient 

remained asymptomatic.


Labs notable for severe hyponatremia, sodium of 117.





Objective


 Vital Signs











  Temp Pulse Pulse Pulse Resp Resp BP


 


 05/18/18 09:02    87    19 


 


 05/18/18 08:56       


 


 05/18/18 08:50    77    20 


 


 05/18/18 07:53  97.6 F  78    18   130/58


 


 05/18/18 06:24  97.6 F  86    22   129/61


 


 05/18/18 00:34   79      132/63


 


 05/17/18 22:00     80  20  


 


 05/17/18 21:30    88    20 


 


 05/17/18 21:19       


 


 05/17/18 21:18    66    20 


 


 05/17/18 19:48   70     


 


 05/17/18 19:47  97.4 F L  77    18   101/46


 


 05/17/18 16:47    72    21 


 


 05/17/18 16:42  97.8 F  76    18   134/63


 


 05/17/18 16:30    68    19 


 


 05/17/18 12:02     79  18  


 


 05/17/18 12:00   82     


 


 05/17/18 11:19  97.6 F  74    20   116/67


 


 05/17/18 10:00       














  Pulse Ox


 


 05/18/18 09:02 


 


 05/18/18 08:56  80 L


 


 05/18/18 08:50 


 


 05/18/18 07:53  97


 


 05/18/18 06:24  95


 


 05/18/18 00:34  90


 


 05/17/18 22:00  98


 


 05/17/18 21:30 


 


 05/17/18 21:19  91


 


 05/17/18 21:18 


 


 05/17/18 19:48  99


 


 05/17/18 19:47  99


 


 05/17/18 16:47 


 


 05/17/18 16:42  99


 


 05/17/18 16:30 


 


 05/17/18 12:02  95


 


 05/17/18 12:00 


 


 05/17/18 11:19  98


 


 05/17/18 10:00  98














- Physical Examination


General: No Apparent Distress


HEENT: Positive: PERRL


Cardiac: Positive: Reg Rate and Rhythm


Lungs: Positive: Wheezes


Neuro: Positive: Grossly Intact


Extremities: Present: +2 Edema





- Labs and Meds


 Lipids











  05/17/18 Range/Units





  17:47 


 


Triglycerides  67  (2-149)  mg/dL


 


Cholesterol  128  ()  mg/dL


 


HDL Cholesterol  58  (40-59)  mg/dL


 


Cholesterol/HDL Ratio  2.20  %








 Comprehensive Metabolic Panel











  05/18/18 Range/Units





  05:56 


 


Sodium  117 L*  (137-145)  mmol/L


 


Potassium  3.9  (3.6-5.0)  mmol/L


 


Chloride  73.9 L  ()  mmol/L


 


Carbon Dioxide  38 H  (22-30)  mmol/L


 


BUN  15  (7-17)  mg/dL


 


Creatinine  0.5 L  (0.7-1.2)  mg/dL


 


Glucose  129 H  ()  mg/dL


 


Calcium  8.5  (8.4-10.2)  mg/dL

## 2018-05-19 LAB
BUN SERPL-MCNC: 21 MG/DL (ref 7–17)
BUN/CREAT SERPL: 35 %
CALCIUM SERPL-MCNC: 8.8 MG/DL (ref 8.4–10.2)
HEMOLYSIS INDEX: 68

## 2018-05-19 RX ADMIN — IPRATROPIUM BROMIDE AND ALBUTEROL SULFATE SCH AMPUL: .5; 3 SOLUTION RESPIRATORY (INHALATION) at 21:47

## 2018-05-19 RX ADMIN — Medication SCH ML: at 22:10

## 2018-05-19 RX ADMIN — IPRATROPIUM BROMIDE AND ALBUTEROL SULFATE SCH AMPUL: .5; 3 SOLUTION RESPIRATORY (INHALATION) at 14:07

## 2018-05-19 RX ADMIN — FUROSEMIDE SCH MG: 10 INJECTION, SOLUTION INTRAVENOUS at 06:42

## 2018-05-19 RX ADMIN — ACETAMINOPHEN PRN MG: 325 TABLET ORAL at 22:02

## 2018-05-19 RX ADMIN — HEPARIN SODIUM SCH UNIT: 5000 INJECTION, SOLUTION INTRAVENOUS; SUBCUTANEOUS at 22:10

## 2018-05-19 RX ADMIN — LISINOPRIL SCH MG: 5 TABLET ORAL at 10:12

## 2018-05-19 RX ADMIN — Medication SCH ML: at 10:11

## 2018-05-19 RX ADMIN — HEPARIN SODIUM SCH UNIT: 5000 INJECTION, SOLUTION INTRAVENOUS; SUBCUTANEOUS at 10:09

## 2018-05-19 RX ADMIN — IPRATROPIUM BROMIDE AND ALBUTEROL SULFATE SCH AMPUL: .5; 3 SOLUTION RESPIRATORY (INHALATION) at 09:19

## 2018-05-19 RX ADMIN — CARVEDILOL SCH MG: 3.12 TABLET, FILM COATED ORAL at 10:10

## 2018-05-19 RX ADMIN — MILRINONE LACTATE IN DEXTROSE SCH MLS/HR: 200 INJECTION, SOLUTION INTRAVENOUS at 06:42

## 2018-05-19 RX ADMIN — POTASSIUM CHLORIDE SCH MEQ: 10 TABLET, EXTENDED RELEASE ORAL at 10:11

## 2018-05-19 RX ADMIN — CARVEDILOL SCH MG: 3.12 TABLET, FILM COATED ORAL at 22:10

## 2018-05-19 NOTE — PROGRESS NOTE
Assessment and Plan


Assessment and plan: 





66 YO Female with HTN, CVA, DM, OA, COPD on Home Oxygen, Chronic Respiratory 

Failure, Nicotine Dependence presents to ED for evaluation. Pt states that she 

has experienced shortness of breath and cough for the past 3 days with 

worsening symptoms over the past 2 days. Pt acknowledges Orthopnea/PND, 

Bilateral leg swelling. Pt states she became concerned this morning, and called 

EMS. Upon arrival, the patient was found to have Acute hypoxemic Respiratory 

Failure with pulse oximetry of 82% on room air. PT was treated with 

supplemental oxygen and transported to [facility] for further care and evaluation. 





Acute on chronic respiratory failure


COPD exacerbation


New onset systolic CHF


Diabetes mellitus


Nicotine dependence


- Echo showed ejection fraction of 10-15%, patient is on milrinone drip and 

other appropriate CHF medications, cardiology consult appreciated


- Patient wants to go home and she is not stable enough to be discharged


- COPD exacerbation is dictated with IV Solu-Medrol, nebulizer, oxygen support


- Sliding-scale insulin, patient consulted about cessation of smoking


DVT prophylaxis


- Heparin


Disposition


- Continue inpatient care, patient doing ischemic workup once she is stable.





severe hyponatremia


- Improved after tolvaptan.








History


Interval history: 





Patient was seen and evaluated this morning, patient says shortness of breath 

is getting better. patient wants to go home.





Hospitalist Physical





- Physical exam


Narrative exam: 





 Not in cardiopulmonary distress. 


 The patient appeared well nourished and normally developed.


 Vital signs as documented.


 Head exam is unremarkable.


 No scleral icterus .


 Neck is without jugular venous distension, thyromegaly, or carotid bruits. 


 Lungs scattered wheezing.


Cardiac exam reveals regular rate and  Rhythm.. 


Abdominal exam reveals normal bowel sounds, no masses, no organomegaly and no 

aortic enlargement. 


Extremities are nonedematous and both femoral and pedal pulses are normal.


CNS: Alert and oriented 3.  No focal weakness.





- Constitutional


Vitals: 


 











Temp Pulse Resp BP Pulse Ox


 


 97.9 F   82   22   99/47   95 


 


 05/19/18 15:51  05/19/18 15:51  05/19/18 15:51  05/19/18 15:51  05/19/18 15:51











General appearance: Present: mild distress





Results





- Labs


CBC & Chem 7: 


 05/16/18 13:31





 05/19/18 00:18


Labs: 


 Laboratory Last Values











WBC  8.8 K/mm3 (4.5-11.0)   05/16/18  13:31    


 


RBC  5.23 M/mm3 (3.65-5.03)  H  05/16/18  13:31    


 


Hgb  15.9 gm/dl (10.1-14.3)  H  05/16/18  13:31    


 


Hct  46.1 % (30.3-42.9)  H  05/16/18  13:31    


 


MCV  88 fl (79-97)   05/16/18  13:31    


 


MCH  30 pg (28-32)   05/16/18  13:31    


 


MCHC  35 % (30-34)  H  05/16/18  13:31    


 


RDW  13.9 % (13.2-15.2)   05/16/18  13:31    


 


Plt Count  274 K/mm3 (140-440)   05/16/18  13:31    


 


Lymph % (Auto)  12.7 % (13.4-35.0)  L  05/16/18  13:31    


 


Mono % (Auto)  8.7 % (0.0-7.3)  H  05/16/18  13:31    


 


Eos % (Auto)  0.3 % (0.0-4.3)   05/16/18  13:31    


 


Baso % (Auto)  0.2 % (0.0-1.8)   05/16/18  13:31    


 


Lymph #  1.1 K/mm3 (1.2-5.4)  L  05/16/18  13:31    


 


Mono #  0.8 K/mm3 (0.0-0.8)   05/16/18  13:31    


 


Eos #  0.0 K/mm3 (0.0-0.4)   05/16/18  13:31    


 


Baso #  0.0 K/mm3 (0.0-0.1)   05/16/18  13:31    


 


Seg Neutrophils %  78.1 % (40.0-70.0)  H  05/16/18  13:31    


 


Seg Neutrophils #  6.9 K/mm3 (1.8-7.7)   05/16/18  13:31    


 


Sodium  126 mmol/L (137-145)  L D 05/19/18  00:18    


 


Potassium  4.4 mmol/L (3.6-5.0)   05/19/18  00:18    


 


Chloride  79.0 mmol/L ()  L  05/19/18  00:18    


 


Carbon Dioxide  39 mmol/L (22-30)  H  05/19/18  00:18    


 


Anion Gap  12 mmol/L  05/19/18  00:18    


 


BUN  21 mg/dL (7-17)  H  05/19/18  00:18    


 


Creatinine  0.6 mg/dL (0.7-1.2)  L  05/19/18  00:18    


 


Estimated GFR  > 60 ml/min  05/19/18  00:18    


 


BUN/Creatinine Ratio  35 %  05/19/18  00:18    


 


Glucose  116 mg/dL ()  H  05/19/18  00:18    


 


POC Glucose  235  ()  H  05/19/18  16:34    


 


Hemoglobin A1c  6.3 % (4-6)  H  05/17/18  17:47    


 


Calcium  8.8 mg/dL (8.4-10.2)   05/19/18  00:18    


 


Troponin T  < 0.010 ng/mL (0.00-0.029)   05/16/18  13:31    


 


NT-Pro-B Natriuret Pep  6600 pg/mL (0-900)  H  05/16/18  13:31    


 


Triglycerides  67 mg/dL (2-149)   05/17/18  17:47    


 


Cholesterol  128 mg/dL ()   05/17/18  17:47    


 


LDL Cholesterol Direct  60 mg/dL ()   05/17/18  17:47    


 


HDL Cholesterol  58 mg/dL (40-59)   05/17/18  17:47    


 


Cholesterol/HDL Ratio  2.20 %  05/17/18  17:47    


 


TSH  3.900 mlU/mL (0.270-4.200)   05/16/18  16:13    


 


Free T4  1.69 ng/dL (0.76-1.46)  H  05/16/18  16:13    


 


Urine Creatinine  19.0 mg/dL (0.1-20.0)   05/16/18  19:57    


 


Urine Sodium  92 mmol/L  05/16/18  19:57

## 2018-05-19 NOTE — PROGRESS NOTE
Subjective


Date of service: 05/19/18


Interval history: 


pt denies orthopnea, no chest pain





Continue therapy for acute systolic heart failure


Sodium has improved, cont to monitor


Non-invasive ischemic cardiac work-up once COPD exacerbation has resolved





Original Note:








Assessment and Plan





COPD exacerbation


Acute systolic heart failure, uncertain etiology


   severe dilated cardiomyopathy, EF 10-15% by echocardiogram.


Severe Hyponatremia





Recommend:


Medical therapy for acute systolic heart failure.


Sodium/fluid restriction.


Daily weight.


Noninvasive ischemic workup with a Persantine thallium will be recommended only 

after the patient's pulmonary status is optimized and has COPD exacerbation is 

completely resolved.





Objective


 Vital Signs











  Temp Pulse Pulse Pulse Resp Resp BP


 


 05/19/18 07:30  97.7 F  66    18  


 


 05/19/18 04:20  97.5 F L  71    18   119/79


 


 05/19/18 04:00   62     


 


 05/19/18 00:03  98.3 F     20   75/38


 


 05/18/18 23:57   68      82/40


 


 05/18/18 22:10    82    12 


 


 05/18/18 22:00    74   18  12 


 


 05/18/18 20:01   78     


 


 05/18/18 19:59  98.8 F  72    18   125/58


 


 05/18/18 18:21    76    18 


 


 05/18/18 18:10    74    18 


 


 05/18/18 17:05  98.9 F  70    20   99/48


 


 05/18/18 12:00   71     


 


 05/18/18 10:37   90     


 


 05/18/18 10:00     72  18  














  BP Pulse Ox


 


 05/19/18 07:30  105/54  98


 


 05/19/18 04:20   87


 


 05/19/18 04:00  


 


 05/19/18 00:03  


 


 05/18/18 23:57   95


 


 05/18/18 22:10  


 


 05/18/18 22:00   96


 


 05/18/18 20:01   90


 


 05/18/18 19:59   83 L


 


 05/18/18 18:21  


 


 05/18/18 18:10  


 


 05/18/18 17:05   90


 


 05/18/18 12:00  


 


 05/18/18 10:37  


 


 05/18/18 10:00   97














- Physical Examination


General: No Apparent Distress


HEENT: Positive: PERRL


Neuro: Positive: Grossly Intact


Extremities: Present: +2 Edema





- Labs and Meds


 Comprehensive Metabolic Panel











  05/19/18 Range/Units





  00:18 


 


Sodium  126 L D  (137-145)  mmol/L


 


Potassium  4.4  (3.6-5.0)  mmol/L


 


Chloride  79.0 L  ()  mmol/L


 


Carbon Dioxide  39 H  (22-30)  mmol/L


 


BUN  21 H  (7-17)  mg/dL


 


Creatinine  0.6 L  (0.7-1.2)  mg/dL


 


Glucose  116 H  ()  mg/dL


 


Calcium  8.8  (8.4-10.2)  mg/dL

## 2018-05-20 VITALS — SYSTOLIC BLOOD PRESSURE: 108 MMHG | DIASTOLIC BLOOD PRESSURE: 56 MMHG

## 2018-05-20 LAB
BUN SERPL-MCNC: 29 MG/DL (ref 7–17)
BUN/CREAT SERPL: 48 %
CALCIUM SERPL-MCNC: 9 MG/DL (ref 8.4–10.2)
HEMOLYSIS INDEX: 2

## 2018-05-20 RX ADMIN — POTASSIUM CHLORIDE SCH MEQ: 10 TABLET, EXTENDED RELEASE ORAL at 10:42

## 2018-05-20 RX ADMIN — LISINOPRIL SCH: 5 TABLET ORAL at 14:39

## 2018-05-20 RX ADMIN — MILRINONE LACTATE IN DEXTROSE SCH MLS/HR: 200 INJECTION, SOLUTION INTRAVENOUS at 07:09

## 2018-05-20 RX ADMIN — Medication SCH ML: at 10:42

## 2018-05-20 RX ADMIN — IPRATROPIUM BROMIDE AND ALBUTEROL SULFATE SCH AMPUL: .5; 3 SOLUTION RESPIRATORY (INHALATION) at 09:21

## 2018-05-20 RX ADMIN — HEPARIN SODIUM SCH UNIT: 5000 INJECTION, SOLUTION INTRAVENOUS; SUBCUTANEOUS at 10:38

## 2018-05-20 RX ADMIN — CARVEDILOL SCH: 3.12 TABLET, FILM COATED ORAL at 14:42

## 2018-05-20 RX ADMIN — IPRATROPIUM BROMIDE AND ALBUTEROL SULFATE SCH: .5; 3 SOLUTION RESPIRATORY (INHALATION) at 14:28

## 2018-05-20 RX ADMIN — FUROSEMIDE SCH MG: 10 INJECTION, SOLUTION INTRAVENOUS at 06:09

## 2018-05-20 NOTE — PROGRESS NOTE
Subjective


Date of service: 05/20/18


Interval history: 





Interval history: 


pt denies orthopnea, no chest pain, milrinone to be discontinued


ok to send home, hyponatremia improved





Continue therapy for acute systolic heart failure


Sodium has improved, cont to monitor


Non-invasive ischemic cardiac work-up once COPD exacerbation has resolved


Ischemic w/u as outpt





Original Note:








Assessment and Plan





COPD exacerbation


Acute systolic heart failure, uncertain etiology


   severe dilated cardiomyopathy, EF 10-15% by echocardiogram.


Severe Hyponatremia





Recommend:


Medical therapy for acute systolic heart failure.


Sodium/fluid restriction.


Daily weight.


Noninvasive ischemic workup with a Persantine thallium will be recommended only 

after the patient's pulmonary status is optimized and has COPD exacerbation is 

completely resolved.





Objective


 Vital Signs











  Temp Pulse Pulse Resp Resp BP BP


 


 05/20/18 09:31    79   18  


 


 05/20/18 09:22    81   18  


 


 05/20/18 09:21       


 


 05/20/18 04:05  97.6 F  71   20   129/64 


 


 05/20/18 04:00   68     


 


 05/20/18 00:17  98.2 F  64   18   131/69 


 


 05/19/18 21:54       


 


 05/19/18 21:53    84   16  


 


 05/19/18 21:45    82   16  


 


 05/19/18 20:27  97.9 F  78   20   133/68 


 


 05/19/18 15:51  97.9 F  82   22   99/47 


 


 05/19/18 14:17    72   20  


 


 05/19/18 14:07    77   20  


 


 05/19/18 12:00  97.6 F  76   22    109/56


 


 05/19/18 10:00  97.6 F  89   22    109/56














  Pulse Ox


 


 05/20/18 09:31 


 


 05/20/18 09:22 


 


 05/20/18 09:21  92


 


 05/20/18 04:05  90


 


 05/20/18 04:00 


 


 05/20/18 00:17  92


 


 05/19/18 21:54  97


 


 05/19/18 21:53 


 


 05/19/18 21:45 


 


 05/19/18 20:27  95


 


 05/19/18 15:51  95


 


 05/19/18 14:17 


 


 05/19/18 14:07 


 


 05/19/18 12:00  93


 


 05/19/18 10:00  93














- Physical Examination


General: No Apparent Distress


HEENT: Positive: PERRL


Cardiac: Positive: Reg Rate and Rhythm, S1/S2


Lungs: Positive: Normal Exam


Neuro: Positive: Grossly Intact


Abdomen: Positive: Unremarkable


Extremities: Present: +2 Edema





- Labs and Meds


 Comprehensive Metabolic Panel











  05/20/18 Range/Units





  00:56 


 


Sodium  128 L  (137-145)  mmol/L


 


Potassium  4.1  (3.6-5.0)  mmol/L


 


Chloride  80.5 L  ()  mmol/L


 


Carbon Dioxide  38 H  (22-30)  mmol/L


 


BUN  29 H  (7-17)  mg/dL


 


Creatinine  0.6 L  (0.7-1.2)  mg/dL


 


Glucose  119 H  ()  mg/dL


 


Calcium  9.0  (8.4-10.2)  mg/dL

## 2018-05-20 NOTE — DISCHARGE SUMMARY
Providers





- Providers


Date of Admission: 


05/16/18 14:52





Attending physician: 


LAYTON YADAV MD





 





05/16/18 14:52


Consult to Physician [CONS] Routine 


   Comment: CHARANBONNER NOTIFIED [id number]


   Consulting Provider: WENCESLAO GOMES


   Physician Instructions: 


   Reason For Exam: chf





05/20/18 04:13


Consult to Case Management [CONS] Routine 


   Services Needed at Discharge: 


   Notified:: no











Primary care physician: 


PRIMARY CARE MD








Hospitalization


Reason for admission: Acute systolic CHF


Condition: Serious


Disposition: DC-01 TO HOME OR SELFCARE


Time spent for discharge: 31 minutes





- Discharge Diagnoses


(1) CHF (congestive heart failure)


Status: Acute   


Qualifiers: 


   Heart failure type: systolic   Heart failure chronicity: acute   Qualified 

Code(s): I50.21 - Acute systolic (congestive) heart failure   





(2) COPD (chronic obstructive pulmonary disease)


Status: Acute   


Qualifiers: 


   COPD type: chronic bronchitis 





(3) Hyponatremia syndrome


Status: Acute   





(4) Hypoxia


Status: Acute   





Core Measure Documentation





- Palliative Care


Palliative Care/ Comfort Measures: Not Applicable





- Core Measures


Any of the following diagnoses?: heart failure





- Heart Failure Discharge Requirements


ACE/ARB for LVSD if EF <40%: Yes


Beta blocker at discharge: Yes





Exam





- Physical Exam


Narrative exam: 





 Not in cardiopulmonary distress. 


 The patient appeared well nourished and normally developed.


 Vital signs as documented.


 Head exam is unremarkable.


 No scleral icterus .


 Neck is without jugular venous distension, thyromegaly, or carotid bruits. 


 Lungs scattered wheezing.


Cardiac exam reveals regular rate and  Rhythm.. 


Abdominal exam reveals normal bowel sounds, no masses, no organomegaly and no 

aortic enlargement. 


Extremities are nonedematous and both femoral and pedal pulses are normal.


CNS: Alert and oriented 3.  No focal weakness.





- Constitutional


Vitals: 


 











Temp Pulse Resp BP Pulse Ox


 


 97.6 F   79   18   129/64   92 


 


 05/20/18 04:05  05/20/18 09:31  05/20/18 09:31  05/20/18 04:05  05/20/18 09:21














Plan


Activity: no restrictions


Weight Bearing Status: Full Weight Bearing


Diet: low cholesterol, low salt


Additional Instructions: follow up at [facility] in 1-2 weeks


Follow up with: 


PRIMARY CARE,MD [Primary Care Provider] - 7 Days


JENNA MAIER MD [Staff Physician] - 7 Days


Prescriptions: 


ALBUTEROL NEB's [Proventil 0.083% NEBS] 2.5 mg IH Q4HRT PRN #1 nebu


 PRN Reason: Shortness Of Breath


Carvedilol [Coreg] 3.125 mg PO BID #60 tablet


Furosemide [Lasix TAB] 40 mg PO QDAY #30 tablet


Lisinopril [Zestril TAB] 2.5 mg PO QDAY #30 tablet


Ondansetron [Zofran INJ] 4 mg IV Q8H PRN #30 vial


 PRN Reason: Nausea And Vomiting


Prednisone [predniSONE 10 mg (6-Day Pack, 21 Tabs)] 10 mg PO .TAPER #1 tab.ds.pk


Ipratropium/Albuterol Sulfate [DUONEB *Not for PRN Use*] 1 ampul IH TIDRT #60 

ampul.neb